# Patient Record
Sex: FEMALE | Race: WHITE | Employment: STUDENT | URBAN - METROPOLITAN AREA
[De-identification: names, ages, dates, MRNs, and addresses within clinical notes are randomized per-mention and may not be internally consistent; named-entity substitution may affect disease eponyms.]

---
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
## 2022-02-02 RX ORDER — MEDROXYPROGESTERONE ACETATE 150 MG/ML
INJECTION, SUSPENSION INTRAMUSCULAR
Qty: 1 ML | Refills: 3 | Status: SHIPPED | OUTPATIENT
Start: 2022-02-02

## 2022-02-02 NOTE — TELEPHONE ENCOUNTER
Depo: 12/17/21    Future appt:    Last Appointment with provider: 5/23/21    Last appointment at [facility]:  12/6/2021  No results found for: CHOLEST, HDL, LDL, TRIGLY, TRIG  No results found for: EAG, A1C  No results found for: T4F, TSH, TSHT4    No follow-ups on file.

## 2022-03-21 ENCOUNTER — NURSE ONLY (OUTPATIENT)
Dept: FAMILY MEDICINE CLINIC | Facility: CLINIC | Age: 22
End: 2022-03-21
Payer: COMMERCIAL

## 2022-03-21 DIAGNOSIS — Z30.42 SURVEILLANCE FOR DEPO-PROVERA CONTRACEPTION: Primary | ICD-10-CM

## 2022-03-21 PROCEDURE — 96372 THER/PROPH/DIAG INJ SC/IM: CPT | Performed by: FAMILY MEDICINE

## 2022-03-21 RX ORDER — MEDROXYPROGESTERONE ACETATE 150 MG/ML
150 INJECTION, SUSPENSION INTRAMUSCULAR ONCE
Status: COMPLETED | OUTPATIENT
Start: 2022-03-21 | End: 2022-03-21

## 2022-03-21 RX ADMIN — MEDROXYPROGESTERONE ACETATE 150 MG: 150 INJECTION, SUSPENSION INTRAMUSCULAR at 13:31:00

## 2022-03-21 NOTE — PROGRESS NOTES
Last Depo injection 12/23/21- Pt is in her window  Last physical 5/3/21 with MT    Pt denies any SOB, chest pain, pain in arms or legs, HA or bleeding or spotting. Depo injection given in Right Deltoid. Pt tolerated well. Left the clinic in stable condition. Next Depo is due June 6- Jun 20.

## 2022-05-18 ENCOUNTER — TELEPHONE (OUTPATIENT)
Dept: FAMILY MEDICINE CLINIC | Facility: CLINIC | Age: 22
End: 2022-05-18

## 2022-05-18 NOTE — TELEPHONE ENCOUNTER
Last depo given 3/21/22 in office. Pt due back for next injection June 6 to June 20th. Last px 5/3/21- pt due for annual exam  Pt contacted- scheduled with EL on 6/13/22- pt will be out of town 6/3- 6/11/22.       Future Appointments   Date Time Provider Ganga David   6/13/2022 11:15 AM Read SYED Fulton [facility]

## 2022-06-13 ENCOUNTER — OFFICE VISIT (OUTPATIENT)
Dept: FAMILY MEDICINE CLINIC | Facility: CLINIC | Age: 22
End: 2022-06-13
Payer: COMMERCIAL

## 2022-06-13 VITALS
TEMPERATURE: 98 F | HEIGHT: 67.25 IN | OXYGEN SATURATION: 99 % | DIASTOLIC BLOOD PRESSURE: 80 MMHG | RESPIRATION RATE: 16 BRPM | BODY MASS INDEX: 20.14 KG/M2 | HEART RATE: 81 BPM | SYSTOLIC BLOOD PRESSURE: 110 MMHG | WEIGHT: 129.81 LBS

## 2022-06-13 DIAGNOSIS — K50.818 CROHN'S DISEASE OF BOTH SMALL AND LARGE INTESTINE WITH OTHER COMPLICATION (HCC): ICD-10-CM

## 2022-06-13 DIAGNOSIS — Z30.9 ENCOUNTER FOR CONTRACEPTIVE MANAGEMENT, UNSPECIFIED TYPE: ICD-10-CM

## 2022-06-13 DIAGNOSIS — Z00.00 ENCOUNTER FOR HEALTH MAINTENANCE EXAMINATION IN ADULT: Primary | ICD-10-CM

## 2022-06-13 DIAGNOSIS — Z11.3 SCREENING FOR STD (SEXUALLY TRANSMITTED DISEASE): ICD-10-CM

## 2022-06-13 DIAGNOSIS — L40.9 PSORIASIS: ICD-10-CM

## 2022-06-13 PROCEDURE — 87591 N.GONORRHOEAE DNA AMP PROB: CPT | Performed by: NURSE PRACTITIONER

## 2022-06-13 PROCEDURE — 87491 CHLMYD TRACH DNA AMP PROBE: CPT | Performed by: NURSE PRACTITIONER

## 2022-06-13 RX ORDER — DEXTROAMPHETAMINE SACCHARATE, AMPHETAMINE ASPARTATE, DEXTROAMPHETAMINE SULFATE AND AMPHETAMINE SULFATE 2.5; 2.5; 2.5; 2.5 MG/1; MG/1; MG/1; MG/1
TABLET ORAL
COMMUNITY
Start: 2022-05-11

## 2022-06-13 RX ORDER — CALCIPOTRIENE AND BETAMETHASONE DIPROPIONATE 50; .5 UG/G; MG/G
AEROSOL, FOAM TOPICAL
COMMUNITY
Start: 2022-04-08

## 2022-06-13 RX ORDER — MEDROXYPROGESTERONE ACETATE 150 MG/ML
150 INJECTION, SUSPENSION INTRAMUSCULAR ONCE
Status: COMPLETED | OUTPATIENT
Start: 2022-06-13 | End: 2022-06-13

## 2022-06-13 RX ORDER — TRIFAROTENE 50 UG/G
CREAM TOPICAL
COMMUNITY
Start: 2022-04-08

## 2022-06-13 RX ORDER — ERGOCALCIFEROL 1.25 MG/1
50000 CAPSULE ORAL
COMMUNITY
Start: 2022-05-22

## 2022-06-13 RX ADMIN — MEDROXYPROGESTERONE ACETATE 150 MG: 150 INJECTION, SUSPENSION INTRAMUSCULAR at 11:47:00

## 2022-06-13 NOTE — PROGRESS NOTES
Last Depo injection- 3/21/22- pt is in her window. Last Physical 6/13/22- with Reuben Reyes    Pt denies any SOB, chest pain, pain in arms or legs, HA or bleeding or spotting. Depo injection given in Left Deltoid.  Pt tolerated well    Next depo injection due Aug 29- Sept 12

## 2022-06-14 ENCOUNTER — TELEPHONE (OUTPATIENT)
Dept: FAMILY MEDICINE CLINIC | Facility: CLINIC | Age: 22
End: 2022-06-14

## 2022-06-14 LAB
C TRACH DNA SPEC QL NAA+PROBE: NEGATIVE
N GONORRHOEA DNA SPEC QL NAA+PROBE: NEGATIVE

## 2022-06-14 NOTE — TELEPHONE ENCOUNTER
----- Message from SYED Mclain sent at 6/14/2022  3:02 PM CDT -----  Please notify patient that gonorrhea and chlamydia is negative. Thank you.

## 2022-06-28 ENCOUNTER — TELEPHONE (OUTPATIENT)
Dept: FAMILY MEDICINE CLINIC | Facility: CLINIC | Age: 22
End: 2022-06-28

## 2022-08-30 ENCOUNTER — NURSE ONLY (OUTPATIENT)
Dept: FAMILY MEDICINE CLINIC | Facility: CLINIC | Age: 22
End: 2022-08-30
Payer: COMMERCIAL

## 2022-08-30 DIAGNOSIS — Z30.42 SURVEILLANCE FOR DEPO-PROVERA CONTRACEPTION: Primary | ICD-10-CM

## 2022-08-30 PROCEDURE — 96372 THER/PROPH/DIAG INJ SC/IM: CPT | Performed by: NURSE PRACTITIONER

## 2022-08-30 RX ORDER — MEDROXYPROGESTERONE ACETATE 150 MG/ML
150 INJECTION, SUSPENSION INTRAMUSCULAR ONCE
Status: COMPLETED | OUTPATIENT
Start: 2022-08-30 | End: 2022-08-30

## 2022-08-30 RX ADMIN — MEDROXYPROGESTERONE ACETATE 150 MG: 150 INJECTION, SUSPENSION INTRAMUSCULAR at 14:09:00

## 2022-08-30 NOTE — PROGRESS NOTES
Last depo injection on 6/13/22. Patient is in her window. Last physical with Maribel Cotton on 6/13/22    Pt denies any SOB, chest pain, pain in arms or legs, HA, bleeding or spotting. Depo injection given in R deltoid. Pt tolerated well. Next depo injection due Nov 15- Nov 29.

## 2022-10-07 ENCOUNTER — OFFICE VISIT (OUTPATIENT)
Dept: FAMILY MEDICINE CLINIC | Facility: CLINIC | Age: 22
End: 2022-10-07
Payer: COMMERCIAL

## 2022-10-07 ENCOUNTER — LAB ENCOUNTER (OUTPATIENT)
Dept: LAB | Age: 22
End: 2022-10-07
Attending: FAMILY MEDICINE
Payer: COMMERCIAL

## 2022-10-07 VITALS
OXYGEN SATURATION: 99 % | SYSTOLIC BLOOD PRESSURE: 112 MMHG | RESPIRATION RATE: 16 BRPM | TEMPERATURE: 99 F | DIASTOLIC BLOOD PRESSURE: 62 MMHG | HEART RATE: 127 BPM | BODY MASS INDEX: 19.67 KG/M2 | HEIGHT: 67.25 IN | WEIGHT: 126.81 LBS

## 2022-10-07 DIAGNOSIS — M25.511 ACUTE PAIN OF BOTH SHOULDERS: Primary | ICD-10-CM

## 2022-10-07 DIAGNOSIS — M25.50 ARTHRALGIA, UNSPECIFIED JOINT: ICD-10-CM

## 2022-10-07 DIAGNOSIS — M25.512 ACUTE PAIN OF BOTH SHOULDERS: ICD-10-CM

## 2022-10-07 DIAGNOSIS — M25.511 ACUTE PAIN OF BOTH SHOULDERS: ICD-10-CM

## 2022-10-07 DIAGNOSIS — M25.512 ACUTE PAIN OF BOTH SHOULDERS: Primary | ICD-10-CM

## 2022-10-07 DIAGNOSIS — K50.818 CROHN'S DISEASE OF BOTH SMALL AND LARGE INTESTINE WITH OTHER COMPLICATION (HCC): ICD-10-CM

## 2022-10-07 LAB
ALBUMIN SERPL-MCNC: 4.2 G/DL (ref 3.4–5)
ALBUMIN/GLOB SERPL: 0.9 {RATIO} (ref 1–2)
ALP LIVER SERPL-CCNC: 70 U/L
ALT SERPL-CCNC: 17 U/L
ANION GAP SERPL CALC-SCNC: 6 MMOL/L (ref 0–18)
AST SERPL-CCNC: 10 U/L (ref 15–37)
BILIRUB SERPL-MCNC: 0.8 MG/DL (ref 0.1–2)
BUN BLD-MCNC: 13 MG/DL (ref 7–18)
CALCIUM BLD-MCNC: 9.7 MG/DL (ref 8.5–10.1)
CHLORIDE SERPL-SCNC: 108 MMOL/L (ref 98–112)
CO2 SERPL-SCNC: 26 MMOL/L (ref 21–32)
CREAT BLD-MCNC: 0.89 MG/DL
CRP SERPL-MCNC: <0.29 MG/DL (ref ?–0.3)
ERYTHROCYTE [SEDIMENTATION RATE] IN BLOOD: 27 MM/HR
FASTING STATUS PATIENT QL REPORTED: NO
GFR SERPLBLD BASED ON 1.73 SQ M-ARVRAT: 95 ML/MIN/1.73M2 (ref 60–?)
GLOBULIN PLAS-MCNC: 4.5 G/DL (ref 2.8–4.4)
GLUCOSE BLD-MCNC: 97 MG/DL (ref 70–99)
OSMOLALITY SERPL CALC.SUM OF ELEC: 290 MOSM/KG (ref 275–295)
POTASSIUM SERPL-SCNC: 3.6 MMOL/L (ref 3.5–5.1)
PROT SERPL-MCNC: 8.7 G/DL (ref 6.4–8.2)
RHEUMATOID FACT SERPL-ACNC: <10 IU/ML (ref ?–15)
SODIUM SERPL-SCNC: 140 MMOL/L (ref 136–145)

## 2022-10-07 PROCEDURE — 86038 ANTINUCLEAR ANTIBODIES: CPT

## 2022-10-07 PROCEDURE — 3078F DIAST BP <80 MM HG: CPT

## 2022-10-07 PROCEDURE — 86225 DNA ANTIBODY NATIVE: CPT

## 2022-10-07 PROCEDURE — 86140 C-REACTIVE PROTEIN: CPT

## 2022-10-07 PROCEDURE — 3008F BODY MASS INDEX DOCD: CPT

## 2022-10-07 PROCEDURE — 86431 RHEUMATOID FACTOR QUANT: CPT

## 2022-10-07 PROCEDURE — 80053 COMPREHEN METABOLIC PANEL: CPT

## 2022-10-07 PROCEDURE — 99214 OFFICE O/P EST MOD 30 MIN: CPT

## 2022-10-07 PROCEDURE — 85652 RBC SED RATE AUTOMATED: CPT

## 2022-10-07 PROCEDURE — 3074F SYST BP LT 130 MM HG: CPT

## 2022-10-07 RX ORDER — MONTELUKAST SODIUM 10 MG/1
10 TABLET ORAL DAILY
COMMUNITY
Start: 2022-09-07

## 2022-10-07 RX ORDER — FLUOCINOLONE ACETONIDE 0.11 MG/ML
OIL TOPICAL
COMMUNITY
Start: 2022-06-17

## 2022-10-11 LAB
DSDNA IGG SERPL IA-ACNC: 2.6 IU/ML
ENA AB SER QL IA: 0.5 UG/L
ENA AB SER QL IA: NEGATIVE

## 2022-11-21 ENCOUNTER — NURSE ONLY (OUTPATIENT)
Dept: FAMILY MEDICINE CLINIC | Facility: CLINIC | Age: 22
End: 2022-11-21
Payer: COMMERCIAL

## 2022-11-21 VITALS — TEMPERATURE: 98 F

## 2022-11-21 DIAGNOSIS — Z30.42 SURVEILLANCE FOR DEPO-PROVERA CONTRACEPTION: Primary | ICD-10-CM

## 2022-11-21 PROCEDURE — 96372 THER/PROPH/DIAG INJ SC/IM: CPT | Performed by: NURSE PRACTITIONER

## 2022-11-21 RX ORDER — MEDROXYPROGESTERONE ACETATE 150 MG/ML
150 INJECTION, SUSPENSION INTRAMUSCULAR ONCE
Status: COMPLETED | OUTPATIENT
Start: 2022-11-21 | End: 2022-11-21

## 2022-11-21 RX ADMIN — MEDROXYPROGESTERONE ACETATE 150 MG: 150 INJECTION, SUSPENSION INTRAMUSCULAR at 10:06:00

## 2022-11-21 NOTE — PROGRESS NOTES
Pt in office for depo provera injection per Alona Decker, jaciel [facility] 6/13/22- pt advised to continue with Depo injections. pt supplied medication and signed consent. Pt within window for injection, given new calendar with next injection date: Feb 6-20. Pt denied any shortness of breath, headache, chest pain, pain in legs/arms, spotting or bleeding, or any concerns with last injection. Given in Left deltoid, pt tolerated well. Pt left office in stable condition.

## 2022-12-06 ENCOUNTER — OFFICE VISIT (OUTPATIENT)
Dept: FAMILY MEDICINE CLINIC | Facility: CLINIC | Age: 22
End: 2022-12-06
Payer: COMMERCIAL

## 2022-12-06 VITALS
SYSTOLIC BLOOD PRESSURE: 118 MMHG | WEIGHT: 130 LBS | OXYGEN SATURATION: 98 % | TEMPERATURE: 98 F | BODY MASS INDEX: 20.17 KG/M2 | HEIGHT: 67.25 IN | RESPIRATION RATE: 18 BRPM | HEART RATE: 133 BPM | DIASTOLIC BLOOD PRESSURE: 72 MMHG

## 2022-12-06 DIAGNOSIS — R68.89 FLU-LIKE SYMPTOMS: Primary | ICD-10-CM

## 2022-12-06 PROCEDURE — 87637 SARSCOV2&INF A&B&RSV AMP PRB: CPT | Performed by: NURSE PRACTITIONER

## 2022-12-06 PROCEDURE — 3074F SYST BP LT 130 MM HG: CPT | Performed by: NURSE PRACTITIONER

## 2022-12-06 PROCEDURE — 3008F BODY MASS INDEX DOCD: CPT | Performed by: NURSE PRACTITIONER

## 2022-12-06 PROCEDURE — 99214 OFFICE O/P EST MOD 30 MIN: CPT | Performed by: NURSE PRACTITIONER

## 2022-12-06 PROCEDURE — 3078F DIAST BP <80 MM HG: CPT | Performed by: NURSE PRACTITIONER

## 2022-12-06 RX ORDER — OSELTAMIVIR PHOSPHATE 75 MG/1
75 CAPSULE ORAL 2 TIMES DAILY
Qty: 10 CAPSULE | Refills: 0 | Status: SHIPPED | OUTPATIENT
Start: 2022-12-06 | End: 2022-12-11

## 2022-12-06 NOTE — PATIENT INSTRUCTIONS
Flu covid rsv today  Start empiric TAmiflu 75mg twice a day for five days, may need to transfer to different pharmacy based on availability  Tylenol if needed for pain  Fluids, pedialyte, pedialyte popsicles, broth based soups  Monitor oxygen level, deep breathing exercises 10 times an hour while awake, light activity as able alternating with resting  Notify GI with being on immunosuppressive therapy

## 2022-12-07 LAB
FLUAV + FLUBV RNA SPEC NAA+PROBE: NOT DETECTED
FLUAV + FLUBV RNA SPEC NAA+PROBE: NOT DETECTED
RSV RNA SPEC NAA+PROBE: NOT DETECTED
SARS-COV-2 RNA RESP QL NAA+PROBE: DETECTED

## 2023-02-04 DIAGNOSIS — Z30.42 SURVEILLANCE FOR DEPO-PROVERA CONTRACEPTION: ICD-10-CM

## 2023-02-06 RX ORDER — MEDROXYPROGESTERONE ACETATE 150 MG/ML
INJECTION, SUSPENSION INTRAMUSCULAR
Qty: 1 ML | Refills: 3 | Status: SHIPPED | OUTPATIENT
Start: 2023-02-06

## 2023-02-06 NOTE — TELEPHONE ENCOUNTER
Depo: 2/2/22    Future appt:    Last Appointment with provider:   Visit date not found  Last appointment at [facility]:    6/13/22  Eunice Mitchell APRN/ Wellness    No results found for: CHOLEST, HDL, LDL, TRIGLY, TRIG  No results found for: EAG, A1C  No results found for: T4F, TSH, TSHT4    No follow-ups on file.

## 2023-02-09 ENCOUNTER — NURSE ONLY (OUTPATIENT)
Dept: FAMILY MEDICINE CLINIC | Facility: CLINIC | Age: 23
End: 2023-02-09
Payer: COMMERCIAL

## 2023-02-09 DIAGNOSIS — Z30.42 SURVEILLANCE FOR DEPO-PROVERA CONTRACEPTION: Primary | ICD-10-CM

## 2023-02-09 PROCEDURE — 96372 THER/PROPH/DIAG INJ SC/IM: CPT | Performed by: NURSE PRACTITIONER

## 2023-02-09 RX ORDER — MEDROXYPROGESTERONE ACETATE 150 MG/ML
150 INJECTION, SUSPENSION INTRAMUSCULAR ONCE
Status: COMPLETED | OUTPATIENT
Start: 2023-02-09 | End: 2023-02-09

## 2023-02-09 RX ADMIN — MEDROXYPROGESTERONE ACETATE 150 MG: 150 INJECTION, SUSPENSION INTRAMUSCULAR at 15:19:00

## 2023-02-09 NOTE — PROGRESS NOTES
Pt in the office for Depo Provera injection per Farzana Babcock, APRN. Last physical 6/13/22. Last injection 11/21/22. Patient is within her window. Patient denies any SOB, chest pain, Pain in arms or legs, HA or bleeding. Depo provera injection given in Right Deltoid. Calendar given with dates to return Apr 27-May 11. Patient tolerated injection well. Left in stable condition.

## 2023-03-20 NOTE — TELEPHONE ENCOUNTER
[facility]   Zaida Frost, 12/11/20, 1:07 PM [No Acute Distress] : no acute distress [Well Nourished] : well nourished [Well Developed] : well developed [Well-Appearing] : well-appearing [No Respiratory Distress] : no respiratory distress  [No Accessory Muscle Use] : no accessory muscle use [Clear to Auscultation] : lungs were clear to auscultation bilaterally [Normal Rate] : normal rate  [Regular Rhythm] : with a regular rhythm [Normal S1, S2] : normal S1 and S2 [No Murmur] : no murmur heard [No Edema] : there was no peripheral edema [No Extremity Clubbing/Cyanosis] : no extremity clubbing/cyanosis [de-identified] : Indwelling catheter [de-identified] : Wheelchair [de-identified] : Baseline

## 2023-05-04 ENCOUNTER — NURSE ONLY (OUTPATIENT)
Dept: FAMILY MEDICINE CLINIC | Facility: CLINIC | Age: 23
End: 2023-05-04
Payer: COMMERCIAL

## 2023-05-04 ENCOUNTER — TELEPHONE (OUTPATIENT)
Dept: FAMILY MEDICINE CLINIC | Facility: CLINIC | Age: 23
End: 2023-05-04

## 2023-05-04 DIAGNOSIS — Z30.42 SURVEILLANCE FOR DEPO-PROVERA CONTRACEPTION: ICD-10-CM

## 2023-05-04 PROCEDURE — 96372 THER/PROPH/DIAG INJ SC/IM: CPT | Performed by: NURSE PRACTITIONER

## 2023-05-04 RX ORDER — MEDROXYPROGESTERONE ACETATE 150 MG/ML
150 INJECTION, SUSPENSION INTRAMUSCULAR ONCE
Status: COMPLETED | OUTPATIENT
Start: 2023-05-04 | End: 2023-05-04

## 2023-05-04 RX ADMIN — MEDROXYPROGESTERONE ACETATE 150 MG: 150 INJECTION, SUSPENSION INTRAMUSCULAR at 14:27:00

## 2023-05-04 NOTE — TELEPHONE ENCOUNTER
Patient is coming in today for an appt.  She can't have the physical because it has not been an entire year but wants to know if she can still get the depo shot  Call back numbr [phone]

## 2023-06-14 ENCOUNTER — OFFICE VISIT (OUTPATIENT)
Dept: FAMILY MEDICINE CLINIC | Facility: CLINIC | Age: 23
End: 2023-06-14
Payer: COMMERCIAL

## 2023-06-14 VITALS
OXYGEN SATURATION: 98 % | RESPIRATION RATE: 16 BRPM | DIASTOLIC BLOOD PRESSURE: 68 MMHG | HEIGHT: 67.25 IN | TEMPERATURE: 98 F | BODY MASS INDEX: 20.32 KG/M2 | WEIGHT: 131 LBS | HEART RATE: 107 BPM | SYSTOLIC BLOOD PRESSURE: 102 MMHG

## 2023-06-14 DIAGNOSIS — Z30.42 SURVEILLANCE FOR DEPO-PROVERA CONTRACEPTION: ICD-10-CM

## 2023-06-14 DIAGNOSIS — Z23 NEED FOR VACCINATION: ICD-10-CM

## 2023-06-14 DIAGNOSIS — Z00.00 ENCOUNTER FOR HEALTH MAINTENANCE EXAMINATION IN ADULT: Primary | ICD-10-CM

## 2023-06-14 DIAGNOSIS — L40.9 PSORIASIS: ICD-10-CM

## 2023-06-14 DIAGNOSIS — K50.818 CROHN'S DISEASE OF BOTH SMALL AND LARGE INTESTINE WITH OTHER COMPLICATION (HCC): ICD-10-CM

## 2023-06-14 LAB — QUANTIFERON TB: NEGATIVE

## 2023-06-14 PROCEDURE — 90471 IMMUNIZATION ADMIN: CPT | Performed by: NURSE PRACTITIONER

## 2023-06-14 PROCEDURE — 3074F SYST BP LT 130 MM HG: CPT | Performed by: NURSE PRACTITIONER

## 2023-06-14 PROCEDURE — 90715 TDAP VACCINE 7 YRS/> IM: CPT | Performed by: NURSE PRACTITIONER

## 2023-06-14 PROCEDURE — 87491 CHLMYD TRACH DNA AMP PROBE: CPT | Performed by: NURSE PRACTITIONER

## 2023-06-14 PROCEDURE — 87591 N.GONORRHOEAE DNA AMP PROB: CPT | Performed by: NURSE PRACTITIONER

## 2023-06-14 PROCEDURE — 3008F BODY MASS INDEX DOCD: CPT | Performed by: NURSE PRACTITIONER

## 2023-06-14 PROCEDURE — 99395 PREV VISIT EST AGE 18-39: CPT | Performed by: NURSE PRACTITIONER

## 2023-06-14 PROCEDURE — 3078F DIAST BP <80 MM HG: CPT | Performed by: NURSE PRACTITIONER

## 2023-06-14 RX ORDER — USTEKINUMAB 90 MG/ML
INJECTION, SOLUTION SUBCUTANEOUS
COMMUNITY
Start: 2023-05-22

## 2023-06-14 RX ORDER — LISDEXAMFETAMINE DIMESYLATE 40 MG/1
40 CAPSULE ORAL DAILY
COMMUNITY
Start: 2023-05-22

## 2023-06-14 NOTE — PATIENT INSTRUCTIONS
Gonorrhea/ chlamydia today - will be back in a few days     Pap today    Tetanus shot today (TDaP)     Vitamin D -  would recommend 5000 units daily -  [employer]. net    Follow up with GI      Thyroid blood work with next labs

## 2023-06-15 LAB
C TRACH DNA SPEC QL NAA+PROBE: NEGATIVE
N GONORRHOEA DNA SPEC QL NAA+PROBE: NEGATIVE

## 2023-07-27 ENCOUNTER — NURSE ONLY (OUTPATIENT)
Dept: FAMILY MEDICINE CLINIC | Facility: CLINIC | Age: 23
End: 2023-07-27
Payer: COMMERCIAL

## 2023-07-27 DIAGNOSIS — Z30.42 SURVEILLANCE FOR DEPO-PROVERA CONTRACEPTION: Primary | ICD-10-CM

## 2023-07-27 PROCEDURE — 96372 THER/PROPH/DIAG INJ SC/IM: CPT | Performed by: NURSE PRACTITIONER

## 2023-07-27 RX ORDER — MEDROXYPROGESTERONE ACETATE 150 MG/ML
150 INJECTION, SUSPENSION INTRAMUSCULAR ONCE
Status: COMPLETED | OUTPATIENT
Start: 2023-07-27 | End: 2023-07-27

## 2023-07-27 RX ADMIN — MEDROXYPROGESTERONE ACETATE 150 MG: 150 INJECTION, SUSPENSION INTRAMUSCULAR at 11:46:00

## 2023-07-27 NOTE — PROGRESS NOTES
Pt in the office for Depo Provera injection per SYED Alejo. Last physical  6/14/23. pt denied any concerns with last injection, no shortness of breath, chest pain, headache, bleeding, or pain in arms/legs     Last injection 5/4/23. Patient is within her window. Injection given in right deltoid. Calendar given with dates Oct 12-26    Pt stated that she is having great results with her extremely bad cramping with being on Depo. Patient tolerated injection well. Left office in stable condition.

## (undated) DIAGNOSIS — Z30.42 SURVEILLANCE FOR DEPO-PROVERA CONTRACEPTION: ICD-10-CM

## (undated) NOTE — LETTER
Date: 10/7/2022    Patient Name: Leeanne Barajas          To Whom it may concern: This letter has been written at the patient's request. The above patient was seen at the [facility] for treatment of a medical condition. This patient should be excused from attending work 10/7/2022 through 10/10/2022. The patient may return to work with the following limitations. No heavy lifting, no lifting greater than 5lbs. Until 11/7/22. Sincerely,   . Rene Velasquez

## (undated) NOTE — LETTER
Date: 6/12/2018    Patient Name: Jessica Emery          To Whom it may concern: This letter has been written at the patient's request. The above patient was seen at the [facility] for treatment of a medical condition.     This patient sh